# Patient Record
Sex: MALE | Race: WHITE | NOT HISPANIC OR LATINO | Employment: UNEMPLOYED | URBAN - METROPOLITAN AREA
[De-identification: names, ages, dates, MRNs, and addresses within clinical notes are randomized per-mention and may not be internally consistent; named-entity substitution may affect disease eponyms.]

---

## 2019-02-19 ENCOUNTER — TELEPHONE (OUTPATIENT)
Dept: NEUROLOGY | Facility: CLINIC | Age: 39
End: 2019-02-19

## 2019-02-25 ENCOUNTER — TRANSCRIBE ORDERS (OUTPATIENT)
Dept: NEUROLOGY | Facility: CLINIC | Age: 39
End: 2019-02-25

## 2019-02-25 DIAGNOSIS — G56.02 CARPAL TUNNEL SYNDROME OF LEFT WRIST: Primary | ICD-10-CM

## 2019-02-25 DIAGNOSIS — M79.632 PAIN OF LEFT FOREARM: ICD-10-CM

## 2019-03-29 ENCOUNTER — HOSPITAL ENCOUNTER (OUTPATIENT)
Dept: NEUROLOGY | Facility: CLINIC | Age: 39
Discharge: HOME | End: 2019-03-29
Payer: COMMERCIAL

## 2019-03-29 DIAGNOSIS — G56.02 CARPAL TUNNEL SYNDROME OF LEFT WRIST: ICD-10-CM

## 2019-03-29 DIAGNOSIS — M79.632 PAIN OF LEFT FOREARM: ICD-10-CM

## 2019-03-29 PROCEDURE — 95909 NRV CNDJ TST 5-6 STUDIES: CPT | Performed by: PSYCHIATRY & NEUROLOGY

## 2019-03-29 PROCEDURE — 95886 MUSC TEST DONE W/N TEST COMP: CPT | Performed by: PSYCHIATRY & NEUROLOGY

## 2025-03-16 ENCOUNTER — APPOINTMENT (EMERGENCY)
Dept: RADIOLOGY | Facility: HOSPITAL | Age: 45
End: 2025-03-16
Payer: COMMERCIAL

## 2025-03-16 ENCOUNTER — APPOINTMENT (EMERGENCY)
Dept: CT IMAGING | Facility: HOSPITAL | Age: 45
End: 2025-03-16
Payer: COMMERCIAL

## 2025-03-16 ENCOUNTER — HOSPITAL ENCOUNTER (EMERGENCY)
Facility: HOSPITAL | Age: 45
Discharge: HOME | End: 2025-03-16
Attending: EMERGENCY MEDICINE | Admitting: EMERGENCY MEDICINE
Payer: COMMERCIAL

## 2025-03-16 VITALS
DIASTOLIC BLOOD PRESSURE: 75 MMHG | OXYGEN SATURATION: 100 % | SYSTOLIC BLOOD PRESSURE: 125 MMHG | HEART RATE: 76 BPM | RESPIRATION RATE: 18 BRPM | TEMPERATURE: 98.6 F

## 2025-03-16 DIAGNOSIS — E87.1 HYPONATREMIA: ICD-10-CM

## 2025-03-16 DIAGNOSIS — R10.32 LEFT LOWER QUADRANT ABDOMINAL PAIN: ICD-10-CM

## 2025-03-16 DIAGNOSIS — K57.92 DIVERTICULITIS: Primary | ICD-10-CM

## 2025-03-16 LAB
ALBUMIN SERPL BCG-MCNC: 4.3 G/DL (ref 3.5–5)
ALP SERPL-CCNC: 83 U/L (ref 34–104)
ALT SERPL W P-5'-P-CCNC: 47 U/L (ref 7–52)
ANION GAP SERPL CALCULATED.3IONS-SCNC: 10 MMOL/L (ref 4–13)
AST SERPL W P-5'-P-CCNC: 33 U/L (ref 13–39)
BACTERIA UR QL AUTO: NORMAL /HPF
BASOPHILS # BLD AUTO: 0.03 THOUSANDS/ÂΜL (ref 0–0.1)
BASOPHILS NFR BLD AUTO: 0 % (ref 0–1)
BILIRUB SERPL-MCNC: 0.94 MG/DL (ref 0.2–1)
BILIRUB UR QL STRIP: NEGATIVE
BUN SERPL-MCNC: 12 MG/DL (ref 5–25)
CALCIUM SERPL-MCNC: 8.9 MG/DL (ref 8.4–10.2)
CHLORIDE SERPL-SCNC: 99 MMOL/L (ref 96–108)
CLARITY UR: CLEAR
CO2 SERPL-SCNC: 24 MMOL/L (ref 21–32)
COLOR UR: YELLOW
CREAT SERPL-MCNC: 1.13 MG/DL (ref 0.6–1.3)
EOSINOPHIL # BLD AUTO: 0.02 THOUSAND/ÂΜL (ref 0–0.61)
EOSINOPHIL NFR BLD AUTO: 0 % (ref 0–6)
ERYTHROCYTE [DISTWIDTH] IN BLOOD BY AUTOMATED COUNT: 14 % (ref 11.6–15.1)
GFR SERPL CREATININE-BSD FRML MDRD: 77 ML/MIN/1.73SQ M
GLUCOSE SERPL-MCNC: 109 MG/DL (ref 65–140)
GLUCOSE UR STRIP-MCNC: NEGATIVE MG/DL
HCT VFR BLD AUTO: 45.3 % (ref 36.5–49.3)
HGB BLD-MCNC: 14.8 G/DL (ref 12–17)
HGB UR QL STRIP.AUTO: ABNORMAL
HOLD SPECIMEN: NORMAL
IMM GRANULOCYTES # BLD AUTO: 0.03 THOUSAND/UL (ref 0–0.2)
IMM GRANULOCYTES NFR BLD AUTO: 0 % (ref 0–2)
KETONES UR STRIP-MCNC: ABNORMAL MG/DL
LACTATE SERPL-SCNC: 0.8 MMOL/L (ref 0.5–2)
LEUKOCYTE ESTERASE UR QL STRIP: NEGATIVE
LIPASE SERPL-CCNC: 11 U/L (ref 11–82)
LYMPHOCYTES # BLD AUTO: 1.54 THOUSANDS/ÂΜL (ref 0.6–4.47)
LYMPHOCYTES NFR BLD AUTO: 18 % (ref 14–44)
MCH RBC QN AUTO: 26.9 PG (ref 26.8–34.3)
MCHC RBC AUTO-ENTMCNC: 32.7 G/DL (ref 31.4–37.4)
MCV RBC AUTO: 82 FL (ref 82–98)
MONOCYTES # BLD AUTO: 1.15 THOUSAND/ÂΜL (ref 0.17–1.22)
MONOCYTES NFR BLD AUTO: 13 % (ref 4–12)
MUCOUS THREADS UR QL AUTO: NORMAL
NEUTROPHILS # BLD AUTO: 5.85 THOUSANDS/ÂΜL (ref 1.85–7.62)
NEUTS SEG NFR BLD AUTO: 69 % (ref 43–75)
NITRITE UR QL STRIP: NEGATIVE
NON-SQ EPI CELLS URNS QL MICRO: NORMAL /HPF
NRBC BLD AUTO-RTO: 0 /100 WBCS
PH UR STRIP.AUTO: 7 [PH]
PLATELET # BLD AUTO: 206 THOUSANDS/UL (ref 149–390)
PMV BLD AUTO: 10.9 FL (ref 8.9–12.7)
POTASSIUM SERPL-SCNC: 3.6 MMOL/L (ref 3.5–5.3)
PROT SERPL-MCNC: 7.7 G/DL (ref 6.4–8.4)
PROT UR STRIP-MCNC: ABNORMAL MG/DL
RBC # BLD AUTO: 5.51 MILLION/UL (ref 3.88–5.62)
RBC #/AREA URNS AUTO: NORMAL /HPF
SODIUM SERPL-SCNC: 133 MMOL/L (ref 135–147)
SP GR UR STRIP.AUTO: <1.005 (ref 1–1.03)
UROBILINOGEN UR STRIP-ACNC: 2 MG/DL
WBC # BLD AUTO: 8.62 THOUSAND/UL (ref 4.31–10.16)
WBC #/AREA URNS AUTO: NORMAL /HPF

## 2025-03-16 PROCEDURE — 99284 EMERGENCY DEPT VISIT MOD MDM: CPT

## 2025-03-16 PROCEDURE — 71045 X-RAY EXAM CHEST 1 VIEW: CPT

## 2025-03-16 PROCEDURE — 96361 HYDRATE IV INFUSION ADD-ON: CPT

## 2025-03-16 PROCEDURE — 85025 COMPLETE CBC W/AUTO DIFF WBC: CPT

## 2025-03-16 PROCEDURE — 80053 COMPREHEN METABOLIC PANEL: CPT

## 2025-03-16 PROCEDURE — 93005 ELECTROCARDIOGRAM TRACING: CPT

## 2025-03-16 PROCEDURE — 83605 ASSAY OF LACTIC ACID: CPT

## 2025-03-16 PROCEDURE — 36415 COLL VENOUS BLD VENIPUNCTURE: CPT

## 2025-03-16 PROCEDURE — 74177 CT ABD & PELVIS W/CONTRAST: CPT

## 2025-03-16 PROCEDURE — 99285 EMERGENCY DEPT VISIT HI MDM: CPT

## 2025-03-16 PROCEDURE — 96374 THER/PROPH/DIAG INJ IV PUSH: CPT

## 2025-03-16 PROCEDURE — 83690 ASSAY OF LIPASE: CPT

## 2025-03-16 PROCEDURE — 81001 URINALYSIS AUTO W/SCOPE: CPT

## 2025-03-16 RX ORDER — KETOROLAC TROMETHAMINE 30 MG/ML
15 INJECTION, SOLUTION INTRAMUSCULAR; INTRAVENOUS ONCE
Status: COMPLETED | OUTPATIENT
Start: 2025-03-16 | End: 2025-03-16

## 2025-03-16 RX ADMIN — SODIUM CHLORIDE 1000 ML: 0.9 INJECTION, SOLUTION INTRAVENOUS at 16:38

## 2025-03-16 RX ADMIN — AMOXICILLIN AND CLAVULANATE POTASSIUM 1 TABLET: 875; 125 TABLET, COATED ORAL at 18:35

## 2025-03-16 RX ADMIN — IOHEXOL 75 ML: 350 INJECTION, SOLUTION INTRAVENOUS at 16:49

## 2025-03-16 RX ADMIN — KETOROLAC TROMETHAMINE 15 MG: 30 INJECTION, SOLUTION INTRAMUSCULAR; INTRAVENOUS at 16:36

## 2025-03-16 NOTE — ED PROVIDER NOTES
Time reflects when diagnosis was documented in both MDM as applicable and the Disposition within this note       Time User Action Codes Description Comment    3/16/2025  6:27 PM Gabby Friedman [K57.92] Diverticulitis     3/16/2025  6:27 PM Gabby Friedman Add [E87.1] Hyponatremia     3/16/2025  6:32 PM Gabby Friedman Add [R10.32] Left lower quadrant abdominal pain           ED Disposition       ED Disposition   Discharge    Condition   Stable    Date/Time   Sun Mar 16, 2025  6:27 PM    Comment   Abimael Verdin discharge to home/self care.                   Assessment & Plan       Medical Decision Making  Differential diagnosis includes, but is not limited to, diverticular disease, unlikely for obstruction, atypical appendicitis, ileus, etc. will obtain labs and imaging.  Other additional interventions at this time include IVF and pain medication.    No fever. No leukocytosis, lactate within normal limits. Mild hyponatremia noted. Other results as noted below. CT abdomen/pelvis showed acute sigmoid diverticulitis without evidence of complications. Antibiotic treatment initiated. Given the recurrence of diverticulitis, a GI referral was provided.    Dietary recommendations and strict return precautions were discussed. PCP follow up advised. The patient acknowledged understanding and agreed to the plan. Discharged in a well-appearing state.    Amount and/or Complexity of Data Reviewed  Labs: ordered.  Radiology: ordered and independent interpretation performed. Decision-making details documented in ED Course.    Risk  Prescription drug management.        ED Course as of 03/17/25 2225   Sun Mar 16, 2025   1824 CT abdomen pelvis with contrast  IMPRESSION:     Acute sigmoid diverticulitis of the sigmoid colon with no evidence of complication. No free air or free fluid.       Medications   sodium chloride 0.9 % bolus 1,000 mL (0 mL Intravenous Stopped 3/16/25 1832)   ketorolac (TORADOL) injection 15 mg (15 mg Intravenous  Given 3/16/25 1636)   iohexol (OMNIPAQUE) 350 MG/ML injection (MULTI-DOSE) 75 mL (75 mL Intravenous Given 3/16/25 1649)   amoxicillin-clavulanate (AUGMENTIN) 875-125 mg per tablet 1 tablet (1 tablet Oral Given 3/16/25 1835)       ED Risk Strat Scores                                                History of Present Illness       Chief Complaint   Patient presents with    Abdominal Pain     Patient states his diverticulitis Is flaring up. Abd pain started 2 days ago. +n/v.        Past Medical History:   Diagnosis Date    Diverticulitis       History reviewed. No pertinent surgical history.   History reviewed. No pertinent family history.   Social History     Tobacco Use    Smoking status: Never    Smokeless tobacco: Never   Vaping Use    Vaping status: Never Used   Substance Use Topics    Alcohol use: Not Currently    Drug use: Never      E-Cigarette/Vaping    E-Cigarette Use Never User       E-Cigarette/Vaping Substances    Nicotine No     Flavoring No       I have reviewed and agree with the history as documented.     The patient is a 44-year-old male with a past medical history of diverticulitis presenting to the emergency department for evaluation of left lower quadrant abdominal pain that started 2 days ago.  Patient reports last BM was two days ago. Patient also reports an episode of emesis yesterday secondary to abdominal discomfort.  He denies fever, chills, chest pain, shortness of breath, melena, hematochezia, hematemesis.  He reports his current symptoms feel similar to when he had diverticulitis.      Abdominal Pain  Associated symptoms: no chest pain, no chills, no cough, no dysuria, no fever, no nausea, no shortness of breath, no sore throat and no vomiting        Review of Systems   Constitutional:  Negative for chills and fever.   HENT:  Negative for ear pain and sore throat.    Eyes:  Negative for pain and visual disturbance.   Respiratory:  Negative for cough and shortness of breath.     Cardiovascular:  Negative for chest pain and palpitations.   Gastrointestinal:  Positive for abdominal pain. Negative for abdominal distention, anal bleeding, blood in stool, nausea and vomiting.   Genitourinary:  Negative for dysuria.   Musculoskeletal:  Negative for arthralgias and back pain.   Skin:  Negative for color change and rash.   Neurological:  Negative for weakness.   All other systems reviewed and are negative.          Objective       ED Triage Vitals   Temperature Pulse Blood Pressure Respirations SpO2 Patient Position - Orthostatic VS   03/16/25 1519 03/16/25 1519 03/16/25 1519 03/16/25 1523 03/16/25 1519 03/16/25 1519   98.6 °F (37 °C) 95 121/71 20 99 % Sitting      Temp Source Heart Rate Source BP Location FiO2 (%) Pain Score    03/16/25 1519 03/16/25 1519 03/16/25 1519 -- 03/16/25 1519    Temporal Monitor Right arm  10 - Worst Possible Pain      Vitals      Date and Time Temp Pulse SpO2 Resp BP Pain Score FACES Pain Rating User   03/16/25 1800 -- 76 100 % 18 125/75 -- -- NUVIA   03/16/25 1700 -- 75 98 % 18 132/71 -- -- NUVIA   03/16/25 1636 -- -- -- -- -- 8 -- AD   03/16/25 1523 -- -- -- 20 -- -- -- AM   03/16/25 1519 98.6 °F (37 °C) 95 99 % -- 121/71 10 - Worst Possible Pain -- AM            Physical Exam  Vitals and nursing note reviewed.   Constitutional:       General: He is not in acute distress.     Appearance: He is well-developed. He is not ill-appearing.   HENT:      Head: Normocephalic and atraumatic.   Eyes:      Conjunctiva/sclera: Conjunctivae normal.   Cardiovascular:      Rate and Rhythm: Normal rate and regular rhythm.      Heart sounds: No murmur heard.  Pulmonary:      Effort: Pulmonary effort is normal. No respiratory distress.      Breath sounds: Normal breath sounds.   Abdominal:      General: There is no distension.      Palpations: Abdomen is soft.      Tenderness: There is abdominal tenderness in the left lower quadrant.   Musculoskeletal:         General: No swelling.       Cervical back: Neck supple.   Skin:     General: Skin is warm and dry.      Capillary Refill: Capillary refill takes less than 2 seconds.   Neurological:      Mental Status: He is alert.   Psychiatric:         Mood and Affect: Mood normal.         Results Reviewed       Procedure Component Value Units Date/Time    Urine Microscopic [[id number]]  (Normal) Collected: 03/16/25 1740    Lab Status: Final result Specimen: Urine, Clean Catch Updated: 03/16/25 1754     RBC, UA 1-2 /hpf      WBC, UA 0-1 /hpf      Epithelial Cells None Seen /hpf      Bacteria, UA Occasional /hpf      MUCUS THREADS None Seen    UA w Reflex to Microscopic w Reflex to Culture [[id number]]  (Abnormal) Collected: 03/16/25 1740    Lab Status: Final result Specimen: Urine, Clean Catch Updated: 03/16/25 1754     Color, UA Yellow     Clarity, UA Clear     Specific Gravity, UA <1.005     pH, UA 7.0     Leukocytes, UA Negative     Nitrite, UA Negative     Protein,  (2+) mg/dl      Glucose, UA Negative mg/dl      Ketones, UA 40 (2+) mg/dl      Urobilinogen, UA 2.0 mg/dl      Bilirubin, UA Negative     Occult Blood, UA Small    CBC and differential [[id number]]  (Abnormal) Collected: 03/16/25 1524    Lab Status: Final result Specimen: Blood from Arm, Left Updated: 03/16/25 1720     WBC 8.62 Thousand/uL      RBC 5.51 Million/uL      Hemoglobin 14.8 g/dL      Hematocrit 45.3 %      MCV 82 fL      MCH 26.9 pg      MCHC 32.7 g/dL      RDW 14.0 %      MPV 10.9 fL      Platelets 206 Thousands/uL      nRBC 0 /100 WBCs      Segmented % 69 %      Immature Grans % 0 %      Lymphocytes % 18 %      Monocytes % 13 %      Eosinophils Relative 0 %      Basophils Relative 0 %      Absolute Neutrophils 5.85 Thousands/µL      Absolute Immature Grans 0.03 Thousand/uL      Absolute Lymphocytes 1.54 Thousands/µL      Absolute Monocytes 1.15 Thousand/µL      Eosinophils Absolute 0.02 Thousand/µL      Basophils Absolute 0.03 Thousands/µL     Narrative:      This is an  appended report.  These results have been appended to a previously verified report.    Lactic acid, plasma (w/reflex if result > 2.0) [[id number]]  (Normal) Collected: 03/16/25 1634    Lab Status: Final result Specimen: Blood from Arm, Right Updated: 03/16/25 1703     LACTIC ACID 0.8 mmol/L     Narrative:      Result may be elevated if tourniquet was used during collection.    Thor draw [[id number]] Collected: 03/16/25 1524    Lab Status: Final result Specimen: Blood from Arm, Left Updated: 03/16/25 1701    Narrative:      The following orders were created for panel order Thor draw.  Procedure                               Abnormality         Status                     ---------                               -----------         ------                     Light Blue Top on hold[[id number]]                           Final result               Gold top on hold[[id number]]                                 Final result               Green / Black tube on hold[[id number]]                       Final result                 Please view results for these tests on the individual orders.    Comprehensive metabolic panel [[id number]]  (Abnormal) Collected: 03/16/25 1524    Lab Status: Final result Specimen: Blood from Arm, Left Updated: 03/16/25 1542     Sodium 133 mmol/L      Potassium 3.6 mmol/L      Chloride 99 mmol/L      CO2 24 mmol/L      ANION GAP 10 mmol/L      BUN 12 mg/dL      Creatinine 1.13 mg/dL      Glucose 109 mg/dL      Calcium 8.9 mg/dL      AST 33 U/L      ALT 47 U/L      Alkaline Phosphatase 83 U/L      Total Protein 7.7 g/dL      Albumin 4.3 g/dL      Total Bilirubin 0.94 mg/dL      eGFR 77 ml/min/1.73sq m     Narrative:      [employer] guidelines for Chronic Kidney Disease (CKD):     Stage 1 with normal or high GFR (GFR > 90 mL/min/1.73 square meters)    Stage 2 Mild CKD (GFR = 60-89 mL/min/1.73 square meters)    Stage 3A Moderate CKD (GFR = 45-59 mL/min/1.73 square meters)    Stage  3B Moderate CKD (GFR = 30-44 mL/min/1.73 square meters)    Stage 4 Severe CKD (GFR = 15-29 mL/min/1.73 square meters)    Stage 5 End Stage CKD (GFR <15 mL/min/1.73 square meters)  Note: GFR calculation is accurate only with a steady state creatinine    Lipase [[id number]]  (Normal) Collected: 03/16/25 1524    Lab Status: Final result Specimen: Blood from Arm, Left Updated: 03/16/25 1542     Lipase 11 u/L             CT abdomen pelvis with contrast   Final Interpretation by Blaine Briones MD (03/16 1814)      Acute sigmoid diverticulitis of the sigmoid colon with no evidence of complication. No free air or free fluid.         Workstation performed: [id number]         XR chest 1 view portable   ED Interpretation by Gabby Friedman PA-C (03/16 1746)   No acute cardiopulmonary process my independent interpretation. Formal radiology reading pending.      Final Interpretation by Blaine Briones MD (03/16 2159)      No acute cardiopulmonary disease.            Workstation performed: [id number]             ECG 12 Lead Documentation Only    Date/Time: 3/16/2025 5:23 PM    Performed by: Gabby Friedman PA-C  Authorized by: Gabby Friedman PA-C    Indications / Diagnosis:  Llq pain  ECG reviewed by me, the ED Provider: yes (mery)    Patient location:  ED  Rate:     ECG rate:  86      ED Medication and Procedure Management   None     Discharge Medication List as of 3/16/2025  6:35 PM        START taking these medications    Details   amoxicillin-clavulanate (AUGMENTIN) 875-125 mg per tablet Take 1 tablet by mouth 2 (two) times a day for 7 days, Starting Sun 3/16/2025, Until Sun 3/23/2025, Normal             ED SEPSIS DOCUMENTATION   Time reflects when diagnosis was documented in both MDM as applicable and the Disposition within this note       Time User Action Codes Description Comment    3/16/2025  6:27 PM Gabyb Friedman Add [K57.92] Diverticulitis     3/16/2025  6:27 PM Gabby Friedman Add [E87.1] Hyponatremia     3/16/2025   6:32 PM Gabby Friedman Add [R10.32] Left lower quadrant abdominal pain                  Gabby Friedman PA-C  03/17/25 5340

## 2025-03-16 NOTE — DISCHARGE INSTRUCTIONS
Please follow-up with gastroenterology, referral has been placed for you, also please follow-up with your primary care provider.      For the acute phase for acute diverticulitis, it is actually recommended to have a clear liquid diet, which is the first 1 to 2 days.  This allows the bowels to rest and reduce irritation.  Foods allowed during this time as clear broth such as chicken, beef, vegetable, clear juices, popsicles, water, herbal tea, no cream or milk.  Once symptoms improve, you may introduce a low fiber diet, this reduces bowel irritation while reintroducing solid foods gradually.  Foods allowed include grains such as white bread, white rice, plain pasta, eggs, tofu, lean poultry, well cooked fish, dairy.  Afterwards, during recovery and prevention phase, a high-fiber diet is recommended, as this can help prevent future flareups by promoting regular bowel movements.    Foods to avoid during an acute flare of diverticulitis is high-fiber foods such as raw fruits, vegetables, whole grains, nuts, seeds, popcorn, spicy, greasy, processed foods, alcohol and caffeine.    All in all, start with clear liquids, transition to a low fiber diet, then return to high-fiber diet for prevention.  Stay hydrated, to prevent constipation and if symptoms worsen, please return to the emergency department.  Please return if you are experiencing fever, chills, worsening abdominal pain, if you develop chest pain, shortness of breath, or any new or worsening symptoms.  Please follow-up with your primary care provider as well to discuss your EKG.  Of note, a referral to gastroenterology has been placed for you.

## 2025-03-23 LAB
ATRIAL RATE: 86 BPM
P AXIS: 63 DEGREES
PR INTERVAL: 164 MS
QRS AXIS: -81 DEGREES
QRSD INTERVAL: 136 MS
QT INTERVAL: 388 MS
QTC INTERVAL: 464 MS
T WAVE AXIS: 27 DEGREES
VENTRICULAR RATE: 86 BPM

## 2025-03-23 PROCEDURE — 93010 ELECTROCARDIOGRAM REPORT: CPT | Performed by: INTERNAL MEDICINE
